# Patient Record
Sex: FEMALE | Race: BLACK OR AFRICAN AMERICAN | Employment: FULL TIME | URBAN - METROPOLITAN AREA
[De-identification: names, ages, dates, MRNs, and addresses within clinical notes are randomized per-mention and may not be internally consistent; named-entity substitution may affect disease eponyms.]

---

## 2017-01-05 ENCOUNTER — OFFICE VISIT (OUTPATIENT)
Dept: FAMILY MEDICINE CLINIC | Age: 32
End: 2017-01-05

## 2017-01-05 VITALS
RESPIRATION RATE: 20 BRPM | HEIGHT: 69 IN | WEIGHT: 208 LBS | OXYGEN SATURATION: 98 % | TEMPERATURE: 97 F | SYSTOLIC BLOOD PRESSURE: 109 MMHG | DIASTOLIC BLOOD PRESSURE: 73 MMHG | BODY MASS INDEX: 30.81 KG/M2 | HEART RATE: 98 BPM

## 2017-01-05 DIAGNOSIS — Z13.228 SCREENING FOR ENDOCRINE, METABOLIC AND IMMUNITY DISORDER: ICD-10-CM

## 2017-01-05 DIAGNOSIS — E04.9 ENLARGEMENT OF THYROID: ICD-10-CM

## 2017-01-05 DIAGNOSIS — Z00.00 ROUTINE PHYSICAL EXAMINATION: Primary | ICD-10-CM

## 2017-01-05 DIAGNOSIS — Z13.0 SCREENING FOR ENDOCRINE, METABOLIC AND IMMUNITY DISORDER: ICD-10-CM

## 2017-01-05 DIAGNOSIS — Z13.220 SCREENING FOR LIPID DISORDERS: ICD-10-CM

## 2017-01-05 DIAGNOSIS — Z13.29 SCREENING FOR ENDOCRINE, METABOLIC AND IMMUNITY DISORDER: ICD-10-CM

## 2017-01-05 NOTE — PROGRESS NOTES
Patient: Erica Gonzalez MRN: [id number]  SSN: [id number]    YOB: 1985  Age: 27 y.o. Sex: female      Date of Service: 1/5/2017   Provider: Edd Cruz   Office Location:   [address] [facility], [address] [place] [address], [address]  Office Phone: [phone]  Office Fax: [phone]        REASON FOR VISIT:   Chief Complaint   Patient presents with   [address]     pt presents to establish care  for Physical        VITALS:   Visit Vitals    /73    Pulse 98    Temp 97 °F (36.1 °C) (Oral)    Resp 20    Ht 5' 9\" (1.753 m)    Wt 208 lb (94.3 kg)    LMP 12/24/2016    SpO2 98%    BMI 30.72 kg/m2       MEDICATIONS:   No reported medications     ALLERGIES:   No Known Allergies     ACTIVE MEDICAL PROBLEMS:  There is no problem list on file for this patient. MEDICAL/SURGICAL HISTORY:  History reviewed. No pertinent past medical history. Past Surgical History   Procedure Laterality Date    Hx cholecystectomy          FAMILY HISTORY:  Family History   Problem Relation Age of Onset    Diabetes Mother     Hypertension Mother     Diabetes Father     Hypertension Father     Cancer Father      metastatic melanoma    Cancer Maternal Grandmother      breast cancer        SOCIAL HISTORY:  Social History   Substance Use Topics    Smoking status: Never Smoker    Smokeless tobacco: Not on file    Alcohol use No            HISTORY OF PRESENT ILLNESS:   Erica Gonzalez is a 27 y.o. female who presents to the office to establish care as a new patient, and for a routine physical exam.   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 Has already had necessary immunizations and PPD placed through the health department. Chronic Medical Problems -   Patient reports no chronic medical conditions, does not take any regular medications.     At her last physical exam a few years ago, she was advised to have her thyroid hormones checked, and was told that her thyroid is a bit enlarged. She has been asymptomatic, but would like to follow up on this today. No family history of thyroid disorders. Acute Concerns -  None today. Health Maintenance -   Last PE with routine labs - 2-3 years ago  Last pap smear - May 2016, normal per patient. Follows with ob/gyn  Last flu shot - 1/3/17  Last Tdap booster - 1/3/17  Up to date with dentist and eye doctor. REVIEW OF SYSTEMS:  Review of Systems   Constitutional: Negative for chills, fever, malaise/fatigue and weight loss. HENT: Negative for congestion, ear pain, hearing loss and sore throat. Eyes: Negative for blurred vision and double vision. Respiratory: Negative for cough, shortness of breath and wheezing. Cardiovascular: Negative for chest pain, palpitations and leg swelling. Gastrointestinal: Negative for abdominal pain, blood in stool, constipation, diarrhea, nausea and vomiting. Genitourinary: Negative for frequency and urgency. Musculoskeletal: Negative for back pain, joint pain, myalgias and neck pain. Skin: Negative for itching and rash. Neurological: Negative for dizziness and headaches. Psychiatric/Behavioral: Negative for depression. The patient is not nervous/anxious. PHYSICAL EXAMINATION:  Physical Exam   Constitutional: She is oriented to person, place, and time and well-developed, well-nourished, and in no distress. HENT:   Head: Normocephalic and atraumatic. Right Ear: External ear normal.   Left Ear: External ear normal.   Nose: Nose normal.   Mouth/Throat: Oropharynx is clear and moist. No oropharyngeal exudate. Eyes: Conjunctivae and EOM are normal. Pupils are equal, round, and reactive to light. Neck: Neck supple. No thyromegaly present. Cardiovascular: Normal rate, regular rhythm and normal heart sounds. Exam reveals no gallop and no friction rub. No murmur heard.   Pulmonary/Chest: Effort normal and breath sounds normal. She has no wheezes. She has no rales. Abdominal: Soft. Bowel sounds are normal. There is no tenderness. There is no rebound and no guarding. Lymphadenopathy:     She has no cervical adenopathy. Neurological: She is alert and oriented to person, place, and time. She displays normal reflexes. Coordination normal.   Skin: Skin is warm and dry. No rash noted. Psychiatric: Mood, memory, affect and judgment normal.   Vitals reviewed. RESULTS:  No results found for this visit on 01/05/17. ASSESSMENT/PLAN:  Debi Plata was seen today for establish care. Diagnoses and all orders for this visit:    Routine physical examination, Screening for lipid disorders, Screening for endocrine and metabolic disorders   - Normal physical exam findings today  - Patient up to date with routine screenings and immunizations  - Check fasting labs as below within 1-2 months   -     METABOLIC PANEL, COMPREHENSIVE; Future  -     LIPID PANEL; Future  -     TSH AND FREE T4; Future  -     CBC WITH AUTOMATED DIFF; Future    Enlargement of thyroid  - No thyromegaly appreciated on exam today, but due to history will check TSH and T4   -     TSH AND FREE T4; Future  -     CBC WITH AUTOMATED DIFF; Future    Follow up in 1 year for PE, or sooner as needed/pending results of labs    Patient expresses understanding and is agreeable with the above plan.       PATIENT CARE TEAM:   Patient Care Team:  Edd Jay as PCP - General  None       Edd Jay   January 5, 2017    4:41 PM

## 2017-01-05 NOTE — MR AVS SNAPSHOT
Visit Information Date & Time Provider Department Dept. Phone Encounter #  
 1/5/2017  3:30 PM Radha CourserDolly [phone] [id number] Upcoming Health Maintenance Date Due DTaP/Tdap/Td series (1 - Tdap) 3/4/2005 PAP AKA CERVICAL CYTOLOGY 3/4/2005 INFLUENZA AGE 9 TO ADULT 8/1/2016 Allergies as of 1/5/2017  Review Complete On: 1/5/2017 By: Sarah Patrick No Known Allergies Current Immunizations  Never Reviewed No immunizations on file. Not reviewed this visit You Were Diagnosed With   
  
 Codes Comments Routine physical examination    -  Primary ICD-10-CM: Z00.00 ICD-9-CM: V70.0 Enlargement of thyroid     ICD-10-CM: E04.9 ICD-9-CM: 240.9 Screening for lipid disorders     ICD-10-CM: Z13.220 ICD-9-CM: V77.91 Screening for endocrine, metabolic and immunity disorder     ICD-10-CM: Z13.29, Z13.228, Z13.0 ICD-9-CM: V77.99 Vitals BP Pulse Temp Resp Height(growth percentile) Weight(growth percentile) 109/73 98 97 °F (36.1 °C) (Oral) 20 5' 9\" (1.753 m) 208 lb (94.3 kg) LMP SpO2 BMI OB Status Smoking Status 12/24/2016 98% 30.72 kg/m2 Having regular periods Never Smoker Vitals History BMI and BSA Data Body Mass Index Body Surface Area 30.72 kg/m 2 2.14 m 2 Your Updated Medication List  
  
Notice  As of 1/5/2017  4:30 PM  
 You have not been prescribed any medications. To-Do List   
 01/05/2017 Lab:  METABOLIC PANEL, COMPREHENSIVE   
  
 01/05/2017 Lab:  TSH AND FREE T4 Around 01/06/2017 Lab:  CBC WITH AUTOMATED DIFF Around 01/06/2017 Lab:  LIPID PANEL Introducing [facility]! Maya Guzman introduces i[employer] patient portal. Now you can access parts of your medical record, email your doctor's office, and request medication refills online. 1. In your internet browser, go to [email or web address] 2. Click on the First Time User? Click Here link in the Sign In box. You will see the New Member Sign Up page. 3. Enter your [employer] Access Code exactly as it appears below. You will not need to use this code after youve completed the sign-up process. If you do not sign up before the expiration date, you must request a new code. · [employer] Access Code: [email or web address] Expires: 4/5/2017  3:16 PM 
 
4. Enter the last four digits of your Social Security Number (xxxx) and Date of Birth (mm/dd/yyyy) as indicated and click Submit. You will be taken to the next sign-up page. 5. Create a [employer] ID. This will be your [employer] login ID and cannot be changed, so think of one that is secure and easy to remember. 6. Create a [employer] password. You can change your password at any time. 7. Enter your Password Reset Question and Answer. This can be used at a later time if you forget your password. 8. Enter your e-mail address. You will receive e-mail notification when new information is available in [address] 9. Click Sign Up. You can now view and download portions of your medical record. 10. Click the Download Summary menu link to download a portable copy of your medical information. If you have questions, please visit the Frequently Asked Questions section of the [employer] website. Remember, [employer] is NOT to be used for urgent needs. For medical emergencies, dial 911. Now available from your iPhone and Android! Please provide this summary of care documentation to your next provider. Your primary care clinician is listed as Vickie Echevarria. If you have any questions after today's visit, please call [phone].

## 2017-09-14 ENCOUNTER — HOSPITAL ENCOUNTER (EMERGENCY)
Age: 32
Discharge: HOME | End: 2017-09-14
Attending: EMERGENCY MEDICINE | Admitting: EMERGENCY MEDICINE
Payer: COMMERCIAL

## 2017-09-14 VITALS
WEIGHT: 198 LBS | OXYGEN SATURATION: 100 % | BODY MASS INDEX: 31.08 KG/M2 | HEART RATE: 80 BPM | TEMPERATURE: 98.1 F | SYSTOLIC BLOOD PRESSURE: 129 MMHG | HEIGHT: 67 IN | DIASTOLIC BLOOD PRESSURE: 88 MMHG | RESPIRATION RATE: 16 BRPM

## 2017-09-14 DIAGNOSIS — H10.31 ACUTE CONJUNCTIVITIS OF RIGHT EYE, UNSPECIFIED ACUTE CONJUNCTIVITIS TYPE: Primary | ICD-10-CM

## 2017-09-14 PROCEDURE — 99283 EMERGENCY DEPT VISIT LOW MDM: CPT

## 2017-09-14 RX ORDER — GENTAMICIN SULFATE 3 MG/ML
SOLUTION/ DROPS OPHTHALMIC
Qty: 15 ML | Refills: 0 | Status: SHIPPED | OUTPATIENT
Start: 2017-09-14 | End: 2019-02-07

## 2017-09-14 NOTE — ED PROVIDER NOTES
HPI Comments: 9:13 AM Clinton Carrington is a 34 y.o. Female presents to ED c/o right eye pain onset last night. Pt states that her right eye burns and that there is some drainage. No eye drops were placed in the right eye PTA. Denies every having this before. Denies any other symptoms or complaints at the moment. The history is provided by the patient. History reviewed. No pertinent past medical history. Past Surgical History:   Procedure Laterality Date    HX CHOLECYSTECTOMY           Family History:   Problem Relation Age of Onset    Diabetes Mother     Hypertension Mother     Diabetes Father     Hypertension Father     Cancer Father      metastatic melanoma    Cancer Maternal Grandmother      breast cancer       Social History     Social History    Marital status: SINGLE     Spouse name: N/A    Number of children: N/A    Years of education: N/A     Occupational History    Not on file. Social History Main Topics    Smoking status: Never Smoker    Smokeless tobacco: Never Used    Alcohol use No    Drug use: No    Sexual activity: No     Other Topics Concern    Not on file     Social History Narrative         ALLERGIES: Review of patient's allergies indicates no known allergies. Review of Systems   Constitutional: Negative for chills, fatigue and fever. HENT: Negative for congestion, ear pain and sore throat. Eyes: Positive for pain, discharge and redness. Negative for itching. Respiratory: Negative for chest tightness, shortness of breath and wheezing. Cardiovascular: Negative for chest pain, palpitations and leg swelling. Gastrointestinal: Negative for abdominal pain, diarrhea, nausea and vomiting. Genitourinary: Negative for dysuria, flank pain, hematuria and pelvic pain. Musculoskeletal: Negative for arthralgias, back pain, joint swelling and myalgias. Skin: Negative for color change, pallor and rash.    Neurological: Negative for dizziness, weakness and headaches. Hematological: Negative for adenopathy. Does not bruise/bleed easily. All other systems reviewed and are negative. Vitals:    09/14/17 0902   BP: 129/88   Pulse: 80   Resp: 16   Temp: 98.1 °F (36.7 °C)   SpO2: 100%   Weight: 89.8 kg (198 lb)   Height: 5' 7\" (1.702 m)            Physical Exam   Constitutional: No distress. HENT:   Head: Normocephalic and atraumatic. Mouth/Throat: Oropharynx is clear and moist.   Eyes: EOM are normal. Pupils are equal, round, and reactive to light. Right conjunctiva is injected (sclera (mildly) with watery discharge ). Neck: Normal range of motion. Neck supple. Cardiovascular: Normal rate, regular rhythm and normal heart sounds. No murmur heard. Pulmonary/Chest: Effort normal and breath sounds normal. She has no wheezes. She has no rales. Abdominal: Soft. Bowel sounds are normal. She exhibits no distension. There is no tenderness. Musculoskeletal: Normal range of motion. She exhibits no edema or deformity. Lymphadenopathy:     She has no cervical adenopathy. Neurological: She is alert. She exhibits normal muscle tone. Coordination normal.   Skin: Skin is warm and dry. No rash noted. She is not diaphoretic. No erythema. Psychiatric: She has a normal mood and affect. Her behavior is normal.        [facility]  ED Course       Procedures      Vitals:  Patient Vitals for the past 12 hrs:   Temp Pulse Resp BP SpO2   09/14/17 0902 98.1 °F (36.7 °C) 80 16 129/88 100 %   Patient is 100% O2 on RA, indicating adequate oxygenation. Medications ordered:   Medications - No data to display      Lab findings:  No results found for this or any previous visit (from the past 12 hour(s)). Disposition: discharged. Diagnosis:   1.  Acute conjunctivitis of right eye, unspecified acute conjunctivitis type          Follow-up Information     None           Patient's Medications   Start Taking    GENTAMICIN (GARAMYCIN) 0.3 % OPHTHALMIC SOLUTION    2 gtts to R eye QID for 5 days   Continue Taking    No medications on file   These Medications have changed    No medications on file   Stop Taking    No medications on file         Scribe Attestation      Roldan Zelaya Clare Lopez acting as a scribe for and in the presence of Ravi Taylor      September 14, 2017 at 9:26 AM       Provider Attestation:      I personally performed the services described in the documentation, reviewed the documentation, as recorded by the scribe in my presence, and it accurately and completely records my words and actions.  September 14, 2017 at 9:26 AM - Nomi Hayden MD

## 2017-09-14 NOTE — LETTER
[facility] EMERGENCY DEPT 
[address] [facility] [address] 
[phone] Work/School Note Date: 9/14/2017 To Whom It May concern: 
 
Flor Glover was seen and treated today in the emergency room by the following provider(s): 
Attending Provider: Judith Parra MD. [facility] 9/15/2017 Sincerely, Lydia REIS RN

## 2017-09-14 NOTE — DISCHARGE INSTRUCTIONS
Pinkeye: Care Instructions  Your Care Instructions    Pinkeye is redness and swelling of the eye surface and the conjunctiva (the lining of the eyelid and the covering of the white part of the eye). Pinkeye is also called conjunctivitis. Pinkeye is often caused by infection with bacteria or a virus. Dry air, allergies, smoke, and chemicals are other common causes. Pinkeye often clears on its own in 7 to 10 days. Antibiotics only help if the pinkeye is caused by bacteria. Pinkeye caused by infection spreads easily. If an allergy or chemical is causing pinkeye, it will not go away unless you can avoid whatever is causing it. Follow-up care is a key part of your treatment and safety. Be sure to make and go to all appointments, and call your doctor if you are having problems. It's also a good idea to know your test results and keep a list of the medicines you take. How can you care for yourself at home? · Wash your hands often. Always wash them before and after you treat pinkeye or touch your eyes or face. · Use moist cotton or a clean, wet cloth to remove crust. Wipe from the inside corner of the eye to the outside. Use a clean part of the cloth for each wipe. · Put cold or warm wet cloths on your eye a few times a day if the eye hurts. · Do not wear contact lenses or eye makeup until the pinkeye is gone. Throw away any eye makeup you were using when you got pinkeye. Clean your contacts and storage case. If you wear disposable contacts, use a new pair when your eye has cleared and it is safe to wear contacts again. · If the doctor gave you antibiotic ointment or eyedrops, use them as directed. Use the medicine for as long as instructed, even if your eye starts looking better soon. Keep the bottle tip clean, and do not let it touch the eye area. · To put in eyedrops or ointment:  ¨ Tilt your head back, and pull your lower eyelid down with one finger.   ¨ Drop or squirt the medicine inside the lower lid.  ¨ Close your eye for 30 to 60 seconds to let the drops or ointment move around. ¨ Do not touch the ointment or dropper tip to your eyelashes or any other surface. · Do not share towels, pillows, or washcloths while you have pinkeye. When should you call for help? Call your doctor now or seek immediate medical care if:  · You have pain in your eye, not just irritation on the surface. · You have a change in vision or loss of vision. · You have an increase in discharge from the eye. · Your eye has not started to improve or begins to get worse within 48 hours after you start using antibiotics. · Pinkeye lasts longer than 7 days. Watch closely for changes in your health, and be sure to contact your doctor if you have any problems. Where can you learn more? Go to http://petra-rosemarie.info/. Enter Y392 in the search box to learn more about \"Pinkeye: Care Instructions. \"  Current as of: March 20, 2017  Content Version: 11.3  © 1549-2473 [employer]. Care instructions adapted under license by [employer] (which disclaims liability or warranty for this information). If you have questions about a medical condition or this instruction, always ask your healthcare professional. [address] any warranty or liability for your use of this information.

## 2019-02-07 ENCOUNTER — HOSPITAL ENCOUNTER (EMERGENCY)
Age: 34
Discharge: HOME | End: 2019-02-07
Attending: EMERGENCY MEDICINE | Admitting: EMERGENCY MEDICINE
Payer: COMMERCIAL

## 2019-02-07 VITALS
TEMPERATURE: 102.7 F | BODY MASS INDEX: 33.32 KG/M2 | RESPIRATION RATE: 18 BRPM | HEIGHT: 65 IN | DIASTOLIC BLOOD PRESSURE: 84 MMHG | SYSTOLIC BLOOD PRESSURE: 132 MMHG | HEART RATE: 114 BPM | OXYGEN SATURATION: 99 % | WEIGHT: 200 LBS

## 2019-02-07 DIAGNOSIS — J02.0 STREP THROAT: Primary | ICD-10-CM

## 2019-02-07 LAB
FLUAV AG NPH QL IA: NEGATIVE
FLUBV AG NOSE QL IA: NEGATIVE

## 2019-02-07 PROCEDURE — 87804 INFLUENZA ASSAY W/OPTIC: CPT

## 2019-02-07 PROCEDURE — 87081 CULTURE SCREEN ONLY: CPT

## 2019-02-07 PROCEDURE — 99282 EMERGENCY DEPT VISIT SF MDM: CPT

## 2019-02-07 RX ORDER — PENICILLIN V POTASSIUM 500 MG/1
500 TABLET, FILM COATED ORAL 4 TIMES DAILY
Qty: 40 TAB | Refills: 0 | Status: SHIPPED | OUTPATIENT
Start: 2019-02-07 | End: 2019-02-17

## 2019-02-07 NOTE — LETTER
[facility] EMERGENCY DEPT 
[address] [facility] [id number] [phone] Work/School Note Date: 2/7/2019 To Whom It May concern: 
 
Kimberly Hernandez was seen and treated today in the emergency room by the following provider(s): 
Attending Provider: Cielo Durham MD. Kimberly Hernandez may return to work on 2/11/19. Sincerely, Cielo Durham MD

## 2019-02-07 NOTE — ED TRIAGE NOTES
Patient states taking Harper [place] Plus and NyQuil for symptoms x 3 days. C/o sore throat, neck pain, headache and left ear pain x 3 days. Voices concerns for flu.

## 2019-02-07 NOTE — ED PROVIDER NOTES
EMERGENCY DEPARTMENT HISTORY AND PHYSICAL EXAM 
 
11:45 AM 
 
 
Date: 2/7/2019 Patient Name: Flor Glover 
 
History of Presenting Illness Chief Complaint Patient presents with  Neck Pain  Sore Throat  Headache  Eye Pain History Provided By: Patient Chief Complaint: Body Aches Duration: 3 Days Timing:  Constant Location: Generalized Quality: Aching Severity: Moderate Modifying Factors: Tried Harper [place] Plus Associated Symptoms: ear pain, sore throat Additional History (Context): Flor Glover is a 28 y.o. female with no PMHx who presents with constant aching moderate generalized body aches that started x 3 days ago. Associated Sx include ear pain and a sore throat. She has tried [employer] and other OTC medications. Pt did not have her flu vaccine. Reports sickly contact, via pt mother who was hospitalized with the flu. Denies cp, SOB. Denies any further complaints or symptoms at the moment. PCP: LOLITA Phillips Past History Past Medical History: 
History reviewed. No pertinent past medical history. Past Surgical History: 
Past Surgical History:  
Procedure Laterality Date  HX CHOLECYSTECTOMY Family History: 
Family History Problem Relation Age of Onset  Diabetes Mother  Hypertension Mother  Diabetes Father  Hypertension Father  Cancer Father   
     metastatic melanoma  Cancer Maternal Grandmother   
     breast cancer Social History: 
Social History Tobacco Use  Smoking status: Never Smoker  Smokeless tobacco: Never Used Substance Use Topics  Alcohol use: No  
 Drug use: No  
 
 
Allergies: 
No Known Allergies Review of Systems Review of Systems Constitutional: Negative for chills, fatigue and fever. HENT: Positive for ear pain and sore throat. Eyes: Negative. Respiratory: Negative for cough and shortness of breath. Cardiovascular: Negative for chest pain and palpitations. Gastrointestinal: Negative for abdominal pain, nausea and vomiting. Genitourinary: Negative for dysuria. Musculoskeletal: Positive for myalgias. Skin: Negative. Neurological: Negative for dizziness, weakness, light-headedness and headaches. Psychiatric/Behavioral: Negative. All other systems reviewed and are negative. Physical Exam  
 
Visit Vitals /84 (BP 1 Location: Left arm, BP Patient Position: At rest) Pulse (!) 114 Temp (!) 102.7 °F (39.3 °C) Resp 18 Ht 5' 4.5\" (1.638 m) Wt 90.7 kg (200 lb) LMP 01/24/2019 SpO2 99% BMI 33.80 kg/m² Physical Exam  
Constitutional: She is oriented to person, place, and time. She appears well-developed and well-nourished. HENT:  
Head: Normocephalic and atraumatic. Mouth/Throat: Oropharynx is clear and moist.  
Eyes: Conjunctivae are normal. Pupils are equal, round, and reactive to light. No scleral icterus. Neck: Normal range of motion. Neck supple. No JVD present. No tracheal deviation present. Cardiovascular: Normal rate, regular rhythm and normal heart sounds. Pulmonary/Chest: Effort normal and breath sounds normal. No respiratory distress. She has no wheezes. Abdominal: Soft. Bowel sounds are normal.  
Musculoskeletal: Normal range of motion. Neurological: She is alert and oriented to person, place, and time. She has normal strength. Gait normal. GCS eye subscore is 4. GCS verbal subscore is 5. GCS motor subscore is 6. Skin: Skin is warm and dry. Psychiatric: She has a normal mood and affect. Nursing note and vitals reviewed. Diagnostic Study Results Labs - Recent Results (from the past 12 hour(s)) INFLUENZA A & B AG (RAPID TEST) Collection Time: 02/07/19 11:23 AM  
Result Value Ref Range Influenza A Antigen NEGATIVE  NEG Influenza B Antigen NEGATIVE  NEG    
STREP THROAT SCREEN  Collection Time: 02/07/19 11:23 AM  
 Result Value Ref Range Special Requests: NO SPECIAL REQUESTS Strep Screen POSITIVE Culture result: PENDING Radiologic Studies - No orders to display Medical Decision Making I am the first provider for this patient. I reviewed the vital signs, available nursing notes, past medical history, past surgical history, family history and social history. Vital Signs-Reviewed the patient's vital signs. Pulse Oximetry Analysis -  99 % on RA, normal 
 
Records Reviewed: Nursing Notes and Old Medical Records (Time of Review: 11:45 AM) ED Course: Progress Notes, Reevaluation, and Consults: 
 
Provider Notes (Medical Decision Making): Pt agrees with dispo and F/U plan. Judith Parra MD 
12:00 PM 
 
 
 
Diagnosis Clinical Impression: 1. Strep throat Disposition: discharged. Follow-up Information Follow up With Specialties Details Why Contact Info LOLITA Phillips Physician Assistant Schedule an appointment as soon as possible for a visit in 1 week For [address] [place] [address] [address] [place] [address] 
[phone] [address] [facility] EMERGENCY DEPT Emergency Medicine Go to As needed, If symptoms worsen Marlyn Sultana [id number] [phone] Medication List  
  
START taking these medications   
penicillin v potassium 500 mg tablet Commonly known as:  VEETID Take 1 Tab by mouth four (4) times daily for 10 days. Where to Get Your Medications Information about where to get these medications is not yet available Ask your nurse or doctor about these medications · penicillin v potassium 500 mg tablet 
  
 
_______________________________ Scribe Attestation Roldan Lopez (Aj) acting as a scribe for and in the presence of Fela Angulo MD     
February 07, 2019 at 11:45 AM 
    
Provider Attestation:     
I personally performed the services described in the documentation, reviewed the documentation, as recorded by the scribe in my presence, and it accurately and completely records my words and actions. February 07, 2019 at 11:45 AM - Teri Perez MD   
 
 
_______________________________

## 2019-02-07 NOTE — DISCHARGE INSTRUCTIONS
Patient Education        Strep Throat: Care Instructions  Your Care Instructions    Strep throat is a bacterial infection that causes sudden, severe sore throat and fever. Strep throat, which is caused by bacteria called streptococcus, is treated with antibiotics. Sometimes a strep test is necessary to tell if the sore throat is caused by strep bacteria. Treatment can help ease symptoms and may prevent future problems. Follow-up care is a key part of your treatment and safety. Be sure to make and go to all appointments, and call your doctor if you are having problems. It's also a good idea to know your test results and keep a list of the medicines you take. How can you care for yourself at home? · Take your antibiotics as directed. Do not stop taking them just because you feel better. You need to take the full course of antibiotics. · Strep throat can spread to others until 24 hours after you begin taking antibiotics. During this time, you should avoid contact with other people at work or home, especially infants and children. Do not sneeze or cough on others, and wash your hands often. Keep your drinking glass and eating utensils separate from those of others, and wash these items well in hot, soapy water. · Gargle with warm salt water at least once each hour to help reduce swelling and make your throat feel better. Use 1 teaspoon of salt mixed in 8 fluid ounces of warm water. · Take an over-the-counter pain medication, such as acetaminophen (Tylenol), ibuprofen (Advil, Motrin), or naproxen (Aleve). Read and follow all instructions on the label. · Try an over-the-counter anesthetic throat spray or throat lozenges, which may help relieve throat pain. · Drink plenty of fluids. Fluids may help soothe an irritated throat. Hot fluids, such as tea or soup, may help your throat feel better. · Eat soft solids and drink plenty of clear liquids.  Flavored ice pops, ice cream, scrambled eggs, sherbet, and gelatin dessert (such as Jell-O) may also soothe the throat. · Get lots of rest.  · Do not smoke, and avoid secondhand smoke. If you need help quitting, talk to your doctor about stop-smoking programs and medicines. These can increase your chances of quitting for good. · Use a vaporizer or humidifier to add moisture to the air in your bedroom. Follow the directions for cleaning the machine. When should you call for help? Call your doctor now or seek immediate medical care if:    · You have a new or higher fever.     · You have a fever with a stiff neck or severe headache.     · You have new or worse trouble swallowing.     · Your sore throat gets much worse on one side.     · Your pain becomes much worse on one side of your throat.    Watch closely for changes in your health, and be sure to contact your doctor if:    · You are not getting better after 2 days (48 hours).     · You do not get better as expected. Where can you learn more? Go to http://petra-rosemarie.info/. Enter K625 in the search box to learn more about \"Strep Throat: Care Instructions. \"  Current as of: March 27, 2018  Content Version: 11.9  © 1770-3099 [employer], [employer]. Care instructions adapted under license by [employer] (which disclaims liability or warranty for this information). If you have questions about a medical condition or this instruction, always ask your healthcare professional. [place] [address] any warranty or liability for your use of this information.

## 2019-02-07 NOTE — ED NOTES
Current Discharge Medication List  
  
START taking these medications Details  
penicillin v potassium (VEETID) 500 mg tablet Take 1 Tab by mouth four (4) times daily for 10 days. Qty: 40 Tab, Refills: 0 Patient armband removed and shredded. Prescription given and reviewed with patient.

## 2019-02-08 LAB
B-HEM STREP THROAT QL CULT: POSITIVE
BACTERIA SPEC CULT: ABNORMAL
SERVICE CMNT-IMP: ABNORMAL

## 2023-12-14 ENCOUNTER — HOSPITAL ENCOUNTER (EMERGENCY)
Facility: HOSPITAL | Age: 38
Discharge: HOME | End: 2023-12-14
Attending: EMERGENCY MEDICINE
Payer: COMMERCIAL

## 2023-12-14 VITALS
HEIGHT: 65 IN | OXYGEN SATURATION: 100 % | HEART RATE: 91 BPM | TEMPERATURE: 98.9 F | DIASTOLIC BLOOD PRESSURE: 87 MMHG | RESPIRATION RATE: 18 BRPM | WEIGHT: 198 LBS | SYSTOLIC BLOOD PRESSURE: 134 MMHG | BODY MASS INDEX: 32.99 KG/M2

## 2023-12-14 DIAGNOSIS — T23.102A SUPERFICIAL BURN OF LEFT HAND, UNSPECIFIED SITE OF HAND, INITIAL ENCOUNTER: Primary | ICD-10-CM

## 2023-12-14 PROCEDURE — 99283 EMERGENCY DEPT VISIT LOW MDM: CPT

## 2023-12-14 PROCEDURE — 6370000000 HC RX 637 (ALT 250 FOR IP): Performed by: EMERGENCY MEDICINE

## 2023-12-14 RX ORDER — OXYCODONE HYDROCHLORIDE AND ACETAMINOPHEN 5; 325 MG/1; MG/1
2 TABLET ORAL
Status: COMPLETED | OUTPATIENT
Start: 2023-12-14 | End: 2023-12-14

## 2023-12-14 RX ADMIN — OXYCODONE HYDROCHLORIDE AND ACETAMINOPHEN 2 TABLET: 5; 325 TABLET ORAL at 22:47

## 2023-12-14 ASSESSMENT — PAIN SCALES - GENERAL
PAINLEVEL_OUTOF10: 10
PAINLEVEL_OUTOF10: 10

## 2023-12-14 ASSESSMENT — PAIN DESCRIPTION - DESCRIPTORS
DESCRIPTORS: BURNING
DESCRIPTORS: BURNING

## 2023-12-14 ASSESSMENT — PAIN DESCRIPTION - FREQUENCY: FREQUENCY: CONTINUOUS

## 2023-12-14 ASSESSMENT — PAIN - FUNCTIONAL ASSESSMENT
PAIN_FUNCTIONAL_ASSESSMENT: 0-10
PAIN_FUNCTIONAL_ASSESSMENT: ACTIVITIES ARE NOT PREVENTED

## 2023-12-14 ASSESSMENT — PAIN DESCRIPTION - ORIENTATION
ORIENTATION: LEFT
ORIENTATION: LEFT

## 2023-12-14 ASSESSMENT — PAIN DESCRIPTION - LOCATION
LOCATION: HAND
LOCATION: HAND

## 2023-12-14 ASSESSMENT — LIFESTYLE VARIABLES
HOW OFTEN DO YOU HAVE A DRINK CONTAINING ALCOHOL: NEVER
HOW MANY STANDARD DRINKS CONTAINING ALCOHOL DO YOU HAVE ON A TYPICAL DAY: PATIENT DOES NOT DRINK

## 2023-12-14 ASSESSMENT — PAIN DESCRIPTION - PAIN TYPE: TYPE: ACUTE PAIN

## 2023-12-15 NOTE — ED NOTES
Administered medication and educated patient on side effects. Patient allergies verified. All questions and concerns answered. Pt instructed to use call bell at the bedside if needed. This nurse will continues to monitor pt at this time. Side rails up.         Rajiv Liu RN  12/14/23 [id number]

## 2023-12-15 NOTE — ED NOTES
Patient discharged at this time. This RN reviewed discharge instructions at this time with the patient. patient verbalized understanding and does not have any questions. Pt ambulatory upon discharge, & in stable condition. Pt armband removed & shredded.       Zaina Mclaughlin RN  12/14/23 [id number]
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
